# Patient Record
Sex: FEMALE | Employment: FULL TIME | URBAN - METROPOLITAN AREA
[De-identification: names, ages, dates, MRNs, and addresses within clinical notes are randomized per-mention and may not be internally consistent; named-entity substitution may affect disease eponyms.]

---

## 2018-05-21 PROBLEM — N39.0 RECURRENT UTI: Status: ACTIVE | Noted: 2018-05-21

## 2018-10-25 ENCOUNTER — HOSPITAL ENCOUNTER (OUTPATIENT)
Dept: ULTRASOUND IMAGING | Age: 53
Discharge: HOME | End: 2018-10-27
Payer: COMMERCIAL

## 2018-10-25 DIAGNOSIS — R10.9 FLANK PAIN: ICD-10-CM

## 2018-10-25 DIAGNOSIS — Z84.1 FAMILY HISTORY OF KIDNEY STONES: ICD-10-CM

## 2018-10-25 PROCEDURE — 76770 US EXAM ABDO BACK WALL COMP: CPT

## 2022-03-28 PROBLEM — N39.3 STRESS INCONTINENCE: Status: ACTIVE | Noted: 2022-03-28

## 2023-08-03 PROBLEM — R55 VASOVAGAL SYNDROME: Status: ACTIVE | Noted: 2023-08-03

## 2023-11-08 LAB
BUN / CREAT RATIO: NORMAL
BUN BLDV-MCNC: 14 MG/DL
CREAT SERPL-MCNC: 0.69 MG/DL

## 2024-02-15 PROBLEM — I10 BENIGN ESSENTIAL HTN: Status: ACTIVE | Noted: 2024-02-15

## 2024-03-21 ENCOUNTER — OFFICE VISIT (OUTPATIENT)
Age: 59
End: 2024-03-21
Payer: COMMERCIAL

## 2024-03-21 VITALS — WEIGHT: 232 LBS | HEIGHT: 65 IN | BODY MASS INDEX: 38.65 KG/M2

## 2024-03-21 DIAGNOSIS — N39.0 RECURRENT UTI: Primary | ICD-10-CM

## 2024-03-21 DIAGNOSIS — R31.29 MICROSCOPIC HEMATURIA: ICD-10-CM

## 2024-03-21 DIAGNOSIS — N39.3 STRESS INCONTINENCE: ICD-10-CM

## 2024-03-21 LAB
BILIRUBIN, POC: NORMAL
BLOOD URINE, POC: NORMAL
CLARITY, POC: CLEAR
COLOR, POC: YELLOW
GLUCOSE URINE, POC: NORMAL
KETONES, POC: NORMAL
LEUKOCYTE EST, POC: NORMAL
NITRITE, POC: NORMAL
PH, POC: NORMAL
PROTEIN, POC: NORMAL
SPECIFIC GRAVITY, POC: NORMAL
UROBILINOGEN, POC: NORMAL

## 2024-03-21 PROCEDURE — 81003 URINALYSIS AUTO W/O SCOPE: CPT | Performed by: SPECIALIST

## 2024-03-21 PROCEDURE — 99214 OFFICE O/P EST MOD 30 MIN: CPT | Performed by: SPECIALIST

## 2024-03-21 RX ORDER — NITROFURANTOIN 25; 75 MG/1; MG/1
100 CAPSULE ORAL DAILY PRN
Qty: 30 CAPSULE | Refills: 3 | Status: SHIPPED | OUTPATIENT
Start: 2024-03-21

## 2024-03-21 NOTE — PROGRESS NOTES
kg/m².  Patient is a 58 y.o. female in no acute distress and alert and oriented to person, place and time.    Assessment and Plan      1. Recurrent UTI    2. Microscopic hematuria    3. Stress incontinence           Plan:      Return in about 1 year (around 3/21/2025).  Patient doing much better after using Macrobid after sexual intercourse to prevent UTI.   She was told to resume Cranberry pills po bid for UTI prevention.   Urine for C&S each and every time patient thinks she has a UTI.   Stable microhematuria on today's UA with negative workup in the past.  Thus, no further workup required.    Continue Kegel exercises for her mild stress urinary incontinence.        Tray Loyd MD FACS  3/21/2024 9:37 AM    2024 Quality Measure Documentation: N/A  Social History     Tobacco Use   Smoking Status Never   Smokeless Tobacco Never     (If patient a smoker, smoking cessation counseling offered)

## 2025-03-24 ENCOUNTER — OFFICE VISIT (OUTPATIENT)
Age: 60
End: 2025-03-24
Payer: COMMERCIAL

## 2025-03-24 VITALS — HEIGHT: 65 IN | WEIGHT: 236 LBS | BODY MASS INDEX: 39.32 KG/M2

## 2025-03-24 DIAGNOSIS — N39.3 STRESS INCONTINENCE: ICD-10-CM

## 2025-03-24 DIAGNOSIS — N39.0 RECURRENT UTI: Primary | ICD-10-CM

## 2025-03-24 DIAGNOSIS — R31.29 MICROSCOPIC HEMATURIA: ICD-10-CM

## 2025-03-24 LAB
BILIRUBIN, POC: NORMAL
BLOOD URINE, POC: NORMAL
CLARITY, POC: CLEAR
COLOR, POC: YELLOW
GLUCOSE URINE, POC: NORMAL MG/DL
KETONES, POC: NORMAL
LEUKOCYTE EST, POC: NORMAL
NITRITE, POC: NORMAL
PH, POC: NORMAL
PROTEIN, POC: NORMAL MG/DL
SPECIFIC GRAVITY, POC: NORMAL
UROBILINOGEN, POC: NORMAL

## 2025-03-24 PROCEDURE — 81003 URINALYSIS AUTO W/O SCOPE: CPT | Performed by: SPECIALIST

## 2025-03-24 PROCEDURE — 99214 OFFICE O/P EST MOD 30 MIN: CPT | Performed by: SPECIALIST

## 2025-03-24 RX ORDER — AZELAIC ACID 0.15 G/G
GEL TOPICAL
COMMUNITY
Start: 2025-03-20

## 2025-03-24 RX ORDER — TRIAMCINOLONE ACETONIDE 1 MG/G
1 CREAM TOPICAL 2 TIMES DAILY
COMMUNITY
Start: 2024-05-30

## 2025-03-24 RX ORDER — NITROFURANTOIN 25; 75 MG/1; MG/1
100 CAPSULE ORAL DAILY PRN
Qty: 30 CAPSULE | Refills: 3 | Status: SHIPPED | OUTPATIENT
Start: 2025-03-24

## 2025-03-24 RX ORDER — VALACYCLOVIR HYDROCHLORIDE 500 MG/1
1 TABLET, FILM COATED ORAL DAILY
COMMUNITY
Start: 2024-04-01

## 2025-03-24 NOTE — PROGRESS NOTES
Tray Loyd MD [facility] Urology Office Progress Note    Patient:  Rubina Manley  YOB: 1965  Date: 3/24/2025    HISTORY OF PRESENT ILLNESS:   The patient is a 59 y.o. female  Patient doing much better since starting Macrobid after sexual intercourse to prevent UTI.   Urine for C&S each and every time patient thinks she has a UTI.   Stable trace microhematuria on today's UA with negative workup in the past.  Thus, no further workup required.    Lower urinary tract symptoms: frequency and nocturia, 2 times per night   Last AUA Symptom Score (QOL): 2 (1)  Today's AUA Symptom Score (QOL): 3 (1)    Summary of old records:   Microhematuria with negative previous workup  History of Recurrent UTI's; UTI after sex-given Macrobid 100 mg po after sex; Cranberry bid  Family history of kidney stones: mother  Rare RASTA: does [place]    Additional History: none    Procedures Today: N/A    Urinalysis today:  Results for orders placed or performed in visit on 03/24/25   POCT Urinalysis No Micro (Auto)   Result Value Ref Range    Color, UA yellow     Clarity, UA clear     Glucose, UA POC neg mg/dL    Bilirubin, UA      Ketones, UA      Spec Grav, UA      Blood, UA POC trace-lysed     pH, UA      Protein, UA POC trace mg/dL    Urobilinogen, UA      Leukocytes, UA neg     Nitrite, UA neg        Last BUN and creatinine:  Lab Results   Component Value Date    BUN 14 11/08/2023     Lab Results   Component Value Date    CREATININE 0.69 11/08/2023       Last CBC:  No results found for: \"WBC\", \"HGB\", \"HCT\", \"MCV\", \"PLT\"    Additional Lab/Culture results: none    Imaging Reviewed during this Office Visit: none  (results were independently reviewed by physician and radiology report verified)    Physical Exam:    There were no vitals filed for this visit.  Body mass index is 39.27 kg/m².  Patient is a 59 y.o. female in no acute distress and alert and oriented to person, place and time.    Assessment